# Patient Record
Sex: MALE
[De-identification: names, ages, dates, MRNs, and addresses within clinical notes are randomized per-mention and may not be internally consistent; named-entity substitution may affect disease eponyms.]

---

## 2019-08-15 ENCOUNTER — HOSPITAL ENCOUNTER (EMERGENCY)
Dept: HOSPITAL 5 - ED | Age: 49
Discharge: HOME | End: 2019-08-15
Payer: SELF-PAY

## 2019-08-15 VITALS — DIASTOLIC BLOOD PRESSURE: 68 MMHG | SYSTOLIC BLOOD PRESSURE: 113 MMHG
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
## 2019-08-15 DIAGNOSIS — S30.1XXA: ICD-10-CM
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
## 2019-08-15 DIAGNOSIS — S16.1XXA: Primary | ICD-10-CM
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
## 2019-08-15 DIAGNOSIS — S09.90XA: ICD-10-CM

## 2019-08-15 LAB
ALBUMIN SERPL-MCNC: 4.1 G/DL (ref 3.9–5)
ALT SERPL-CCNC: 22 UNITS/L (ref 7–56)
BASOPHILS # (AUTO): 0 K/MM3 (ref 0–0.1)
BASOPHILS NFR BLD AUTO: 0.5 % (ref 0–1.8)
BUN SERPL-MCNC: 11 MG/DL (ref 9–20)
BUN/CREAT SERPL: 12 %
CALCIUM SERPL-MCNC: 9.1 MG/DL (ref 8.4–10.2)
EOSINOPHIL # BLD AUTO: 0.1 K/MM3 (ref 0–0.4)
EOSINOPHIL NFR BLD AUTO: 2.3 % (ref 0–4.3)
HCT VFR BLD CALC: 42.8 % (ref 35.5–45.6)
HEMOLYSIS INDEX: 3
HGB BLD-MCNC: 14.5 GM/DL (ref 11.8–15.2)
LYMPHOCYTES # BLD AUTO: 0.9 K/MM3 (ref 1.2–5.4)
LYMPHOCYTES NFR BLD AUTO: 18.3 % (ref 13.4–35)
MCHC RBC AUTO-ENTMCNC: 34 % (ref 32–34)
MCV RBC AUTO: 96 FL (ref 84–94)
MONOCYTES # (AUTO): 0.5 K/MM3 (ref 0–0.8)
MONOCYTES % (AUTO): 9.6 % (ref 0–7.3)
PLATELET # BLD: 173 K/MM3 (ref 140–440)
RBC # BLD AUTO: 4.48 M/MM3 (ref 3.65–5.03)

## 2019-08-15 PROCEDURE — 74177 CT ABD & PELVIS W/CONTRAST: CPT

## 2019-08-15 PROCEDURE — 96375 TX/PRO/DX INJ NEW DRUG ADDON: CPT

## 2019-08-15 PROCEDURE — 70490 CT SOFT TISSUE NECK W/O DYE: CPT

## 2019-08-15 PROCEDURE — 70450 CT HEAD/BRAIN W/O DYE: CPT

## 2019-08-15 PROCEDURE — 83735 ASSAY OF MAGNESIUM: CPT

## 2019-08-15 PROCEDURE — 80053 COMPREHEN METABOLIC PANEL: CPT

## 2019-08-15 PROCEDURE — 36415 COLL VENOUS BLD VENIPUNCTURE: CPT

## 2019-08-15 PROCEDURE — 99284 EMERGENCY DEPT VISIT MOD MDM: CPT

## 2019-08-15 PROCEDURE — 93010 ELECTROCARDIOGRAM REPORT: CPT

## 2019-08-15 PROCEDURE — 96374 THER/PROPH/DIAG INJ IV PUSH: CPT

## 2019-08-15 PROCEDURE — 93005 ELECTROCARDIOGRAM TRACING: CPT

## 2019-08-15 PROCEDURE — 85025 COMPLETE CBC W/AUTO DIFF WBC: CPT
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
